# Patient Record
Sex: FEMALE | Race: WHITE | NOT HISPANIC OR LATINO | Employment: FULL TIME
[De-identification: names, ages, dates, MRNs, and addresses within clinical notes are randomized per-mention and may not be internally consistent; named-entity substitution may affect disease eponyms.]

---
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
## 2021-01-18 ENCOUNTER — RECORDS - HEALTHEAST (OUTPATIENT)
Dept: ADMINISTRATIVE | Facility: OTHER | Age: 21
End: 2021-01-18

## 2021-01-21 ENCOUNTER — COMMUNICATION - HEALTHEAST (OUTPATIENT)
Dept: TELEHEALTH | Facility: CLINIC | Age: 21
End: 2021-01-21

## 2021-01-21 ENCOUNTER — OFFICE VISIT - HEALTHEAST (OUTPATIENT)
Dept: PEDIATRICS | Facility: CLINIC | Age: 21
End: 2021-01-21

## 2021-01-21 DIAGNOSIS — H69.91 EUSTACHIAN TUBE DYSFUNCTION, RIGHT: ICD-10-CM

## 2021-01-21 RX ORDER — GLIPIZIDE 5 MG/1
5 TABLET, FILM COATED, EXTENDED RELEASE ORAL DAILY
Status: SHIPPED | COMMUNITY
Start: 2021-01-08 | End: 2024-01-31 | Stop reason: ALTCHOICE

## 2021-01-21 RX ORDER — NAPROXEN 500 MG/1
TABLET ORAL
Status: SHIPPED | COMMUNITY
Start: 2020-07-27 | End: 2024-06-12

## 2021-05-27 NOTE — PROGRESS NOTES
[facility] Pediatric Acute Visit     HPI:  Beckie Varma is a 19 y.o.  female who presents to the clinic with mom.  Mom brings her in because she has had cough with nasal congestion for 4 days now.  In addition she has had some body aches, feelings of nausea with no vomiting, and headache.  She is not running any fevers that they have noted.  Yesterday she started complaining of a sore throat.  Mom is wondering if she may have strep throat or influenza.  She did not receive a flu vaccine this fall.  Her sister was sick last week with similar symptoms but she was better within 3 days.  Mom also had similar symptoms last week but her symptoms only lasted for 24 hours.  Neither of them were seen and evaluated.        Past Med / Surg History:  Past Medical History:   Diagnosis Date     Diabetes-MODY3      No past surgical history on file.    Fam / Soc History:  Family History   Problem Relation Age of Onset     Diabetes Father      Diabetes Sister      Social History     Social History Narrative    Mom- Marion    Dad- Wally    Sister- Frieda         ROS:  Gen: No fever or fatigue  Eyes: No eye discharge.   ENT: Positive for nasal congestion and rhinorrhea and pharyngitis. No otalgia.  Resp: No SOB, positive for cough   GI:No diarrhea, positive for nausea with no vomiting  :No dysuria  MS: Positive for body and muscle aches  Skin: No rashes  Neuro: Positive for headaches  Lymph/Hematologic: No gland swelling      Objective:  Vitals: /70 (Patient Site: Left Arm, Patient Position: Sitting, Cuff Size: Adult Regular)   Pulse 100   Temp 98.1  F (36.7  C) (Oral)   Wt 140 lb (63.5 kg)   SpO2 97%     Gen: Alert, well appearing  ENT: No nasal congestion or rhinorrhea. Oropharynx normal, moist mucosa.  TMs normal bilaterally.  Eyes: Conjunctivae clear bilaterally.   Heart: Regular rate and rhythm; normal S1 and S2; no murmurs, gallops, or rubs.  Lungs: Unlabored respirations; clear breath sounds.  Abdomen: Soft,  without organomegaly. Bowel sounds normal. Nontender. No masses palpable. No distention.  Musculoskeletal: Joints with full range-of-motion. Normal upper and lower extremities.  Skin: Normal without lesions.  Neuro: Oriented. Normal reflexes; normal tone; no focal deficits appreciated. Appropriate for age.  Hematologic/Lymph/Immune: No cervical lymphadenopathy  Psychiatric: Appropriate affect      Pertinent results / imaging:  Reviewed     Assessment and Plan:    Beckie Varma is a 19 y.o. female with:    1. Fever, unspecified fever cause    - Influenza A/B Rapid Test  Results for orders placed or performed in visit on 03/27/19   Influenza A/B Rapid Test   Result Value Ref Range    Influenza  A, Rapid Antigen Influenza A antigen detected (!) No Influenza A antigen detected    Influenza B, Rapid Antigen No Influenza B antigen detected No Influenza B antigen detected       2. Influenza A    This is day 3-4 of her illness.  We discussed ongoing symptomatic treatment.  We will not start Tamiflu.  If there is no improvement or worsening symptoms she should be seen back in follow-up and mom agrees with that plan.        Delfina Kitchen CNP  3/27/2019

## 2021-05-28 ASSESSMENT — ASTHMA QUESTIONNAIRES: ACT_TOTALSCORE: 25

## 2021-06-01 VITALS — HEIGHT: 67 IN | WEIGHT: 141.2 LBS | BODY MASS INDEX: 22.16 KG/M2

## 2021-06-02 VITALS — WEIGHT: 140 LBS | BODY MASS INDEX: 22.26 KG/M2

## 2021-06-05 VITALS
WEIGHT: 147.9 LBS | HEART RATE: 64 BPM | DIASTOLIC BLOOD PRESSURE: 70 MMHG | SYSTOLIC BLOOD PRESSURE: 118 MMHG | BODY MASS INDEX: 23.51 KG/M2 | TEMPERATURE: 98.1 F

## 2021-06-14 NOTE — PROGRESS NOTES
[facility] Pediatric Acute Visit     HPI:  Beckie Varma is a 20 y.o.  female who presents to the clinic with complaints of feeling like her right ear is plugged.  She traveled to the [place] a month ago and did scuba diving with her family.  She really does not feel like this started bothering her though until about 2 days ago.  She denies any signs of illness and has no rhinitis, allergy symptoms, or cough.  This is not affecting her sleep.  She does notice that it seems to bother her more when she is chewing.  She denies any gum or tooth pain though.        Past Med / Surg History:  Past Medical History:   Diagnosis Date     Diabetes-MODY3      No past surgical history on file.    Fam / Soc History:  Family History   Problem Relation Age of Onset     Diabetes Father      Diabetes Sister      Social History     Social History Narrative    Mom- Marion    Dad- Wally    Sister- Frieda         ROS:  Gen: No fever or fatigue  Eyes: No eye discharge.   ENT: No nasal congestion or rhinorrhea. No pharyngitis.  Positive for right otalgia.  Resp: No SOB, cough or wheezing.  GI:No diarrhea, nausea or vomiting  :No dysuria  MS: No joint/bone/muscle tenderness.  Skin: No rashes  Neuro: No headaches  Lymph/Hematologic: No gland swelling      Objective:  Vitals: /70   Pulse 64   Temp 98.1  F (36.7  C) (Oral)   Wt 147 lb 14.4 oz (67.1 kg)   LMP 12/22/2020     Gen: Alert, well appearing  ENT: No nasal congestion or rhinorrhea. Oropharynx normal, moist mucosa.  Left TM is normal.  Right TM is normal but does reveal some clear fluid behind the TM.  There is decreased mobility.  Eyes: Conjunctivae clear bilaterally.   Musculoskeletal: Joints with full range-of-motion. Normal upper and lower extremities.  Skin: Normal without lesions.  Neuro: Oriented. Normal reflexes; normal tone; no focal deficits appreciated. Appropriate for age.  Hematologic/Lymph/Immune: No cervical lymphadenopathy  Psychiatric: Appropriate  affect      Pertinent results / imaging:  Reviewed     Assessment and Plan:    Beckie Varma is a 20 y.o. female with:    1. Eustachian tube dysfunction, right      I have reassured her she does not have an ear infection.  She does have poor mobility of that eardrum and I discussed eustachian tube dysfunction.  She will try gentle popping of her cheeks to see if she can pop that station tube open.  I have discussed use of Sudafed 30 mg given scheduled every 6-8 hours.  And she can try ibuprofen for the discomfort.  If the pain gets significantly worse, is affecting her sleep, and she develops a fever, she should be seen for reevaluation and she agrees with that plan.    Delfina Kitchen CNP  1/21/2021

## 2021-06-15 NOTE — PROCEDURE: PATHOLOGY BILLING
Low back pain without sciatica, unspecified back pain laterality, unspecified chronicity Cpt Code (31293, 29079 Or 26116): 22083 Cpt Code: 03758

## 2021-06-18 NOTE — PATIENT INSTRUCTIONS - HE
Patient Instructions by Delfina Kitchen CNP at 1/21/2021  3:15 PM     Author: Delfina Kitchen CNP Service: -- Author Type: Nurse Practitioner    Filed: 1/21/2021  3:29 PM Encounter Date: 1/21/2021 Status: Signed    : Delfina Kitchen CNP (Nurse Practitioner)       Patient Education     Eustachian Tube Obstruction (Child)   Sudafed 30 mg take by mouth every 6 hour-8 hours  The eustachian tube is behind the eardrum. It connects the middle ear to the back of the throat. The tube is usually closed. But it opens during yawning or swallowing. This helps make the air pressure in the middle ear the same as outside the ear. The tube also drains mucus made in the middle ear. A blocked tube is called a eustachian tube obstruction. This is common in babies, whose eustachian tubes are small and still forming.  A eustachian tube that is blocked causes pressure, pain, and loss of hearing. Sounds may be muffled. The ear may feel full. A nonverbal child may cry and pull at the ears. An older child may complain of pain, dizziness, pressure in the ear, or trouble hearing. The child may hear humming or ringing. An obstruction (blockage) can sometimes lead to an ear infection.  The blockage often goes away on its own without treatment. In other cases, treatments may be given to help reduce swelling within the tube. These may include a nasal decongestant, antihistamine, nasal spray (prescription steroid or over-the-counter saline), or allergy treatment. An ear infection may be treated with antibiotics. A blocked eustachian tube is usually a short-term problem. In certain cases, surgery may be needed to place a special tube in the eardrum. The tube helps to drain fluid from the middle ear. This may lower the risk of hearing loss and future ear infections.  Home care  Medicines may be prescribed to reduce fluid and inflammation or to treat an ear infection. Follow instructions for giving these medicines to your  child.  Prevention    Keep a baby's head upright when bottle-feeding. This is to prevent formula from entering the eustachian tube.    Don't smoke and do not let others smoke around your child.    Keep your kelsea ear canal dry. Have your child wear ear plugs when taking a bath or playing in a pool.    Teach your child to swallow or yawn to open the tubes and equalize pressure.    Take steps to reduce painful pressure in the tube when your child flies on an airplane. Pressure can build up in the eustachian tube during air travel, especially during takeoff and landing. This can be very painful for young children. If you have a young child, try breast- or bottle-feeding during takeoff and landing. Teach your older child to swallow or yawn at these times to help equalize pressure.  Follow-up care  Follow up with your kelsea healthcare provider, or as directed.  When to seek medical advice  Unless advised otherwise, call your child's healthcare provider if:    Fever (see Fever and children, below)    Hearing loss or trouble hearing    Symptoms last longer than a few weeks    Redness or swelling in the ear gets worse    Pain gets worse    Foul-smelling fluid drains from the ear    New symptoms develop     Fever and children  Always use a digital thermometer to check your kelsea temperature. Never use a mercury thermometer.  For infants and toddlers, be sure to use a rectal thermometer correctly. A rectal thermometer may accidentally poke a hole in (perforate) the rectum. It may also pass on germs from the stool. Always follow the product makers directions for proper use. If you dont feel comfortable taking a rectal temperature, use another method. When you talk to your kelsea healthcare provider, tell him or her which method you used to take your kelsea temperature.  Here are guidelines for fever temperature. Ear temperatures arent accurate before 6 months of age. Dont take an oral temperature until your child is at  least 4 years old.  Infant under 3 months old:    Ask your kelsea healthcare provider how you should take the temperature.    Rectal or forehead (temporal artery) temperature of 100.4 F (38 C) or higher, or as directed by the provider    Armpit temperature of 99 F (37.2 C) or higher, or as directed by the provider  Child age 3 to 36 months:    Rectal, forehead (temporal artery), or ear temperature of 102 F (38.9 C) or higher, or as directed by the provider    Armpit temperature of 101 F (38.3 C) or higher, or as directed by the provider  Child of any age:    Repeated temperature of 104 F (40 C) or higher, or as directed by the provider    Fever that lasts more than 24 hours in a child under 2 years old. Or a fever that lasts for 3 days in a child 2 years or older.      Date Last Reviewed: 11/1/2017 2000-2017 [employer]. [address] [place] [address]. All rights reserved. This information is not intended as a substitute for professional medical care. Always follow your healthcare professional's instructions.

## 2021-06-19 NOTE — LETTER
Letter by Delfina Kitchen CNP at      Author: Delfina Kitchen CNP Service: -- Author Type: --    Filed:  Encounter Date: 3/27/2019 Status: (Other)         March 27, 2019     Patient: Beckie Varma   YOB: 2000   Date of Visit: 3/27/2019       To Whom it May Concern:    Beckie Varma was seen in my clinic on 3/27/2019.  Please excuse her absence from school today.  She can return when feeling better.    If you have any questions or concerns, please don't hesitate to call.    Sincerely,         Electronically signed by Delfina Kitchen CNP

## 2021-06-19 NOTE — PROGRESS NOTES
[facility] Well Child Check    ASSESSMENT & PLAN  Beckie Varma is a 18  y.o. 5  m.o. who has a normal exam.  Her diabetes is under good control.  Her asthma is also under good control.  She has been having very bad cramps with her menstrual cycles and is considering seeing mom's OB/GYN for birth control.    Diagnoses and all orders for this visit:    Well adolescent visit  -     Meningococcal MCV4P  -     Hearing Screening        Return to clinic in 1 year for a Well Child Check or sooner as needed    IMMUNIZATIONS/LABS  Immunizations were reviewed and orders were placed as appropriate. and I have discussed the risks and benefits of all of the vaccine components with the patient/parents.  All questions have been answered.    REFERRALS  Dental:  The patient has already established care with a dentist.  Other:  No additional referrals were made at this time.    ANTICIPATORY GUIDANCE  I have reviewed age appropriate anticipatory guidance.    HEALTH HISTORY  Do you have any concerns that you'd like to discuss today?: menstrual cramps- advil/ aleve does not help      Roomed by: Claudia    Accompanied by Mother    Refills needed? No    Do you have any forms that need to be filled out? Yes sports form       Do you have any significant health concerns in your family history?: No  Family History   Problem Relation Age of Onset     Diabetes Father      Diabetes Sister      Since your last visit, have there been any major changes in your family, such as a move, job change, separation, divorce, or death in the family?: No  Has a lack of transportation kept you from medical appointments?: No    Home  Who lives in your home?:    Social History     Social History Narrative    Mom- Marion    Dad- Wally    Sister- Frieda     Do you have any concerns about losing your housing?: No  Is your housing safe and comfortable?: Yes  Do you have any trouble with sleep?:  No    Education  What school do you child attend?:  [employer]  What grade are you in?:  12th  How do you perform in school (grades, behavior, attention, homework?: doing well     Eating  Do you eat regular meals including fruits and vegetables?:  yes  What are you drinking (cow's milk, water, soda, juice, sports drinks, energy drinks, etc)?: cow's milk- 1%, water and almond milk  Have you been worried that you don't have enough food?: No  Do you have concerns about your body or appearance?:  No    Activities  Do you have friends?:  yes  Do you get at least one hour of physical activity per day?:  no, not as active  How many hours a day are you in front of a screen other than for schoolwork (computer, TV, phone)?:  2  What do you do for exercise?:  Hockey   Do you have interest/participate in community activities/volunteers/school sports?:  yes    MENTAL HEALTH SCREENING  No Data Recorded  No Data Recorded    VISION/HEARING  Vision: Not done: Performed elsewhere: 9/2017, eye doctor 20/20 with slightly nearsighted  Hearing:  Completed. See Results    No exam data present    TB Risk Assessment:  The patient and/or parent/guardian answer positive to:  patient and/or parent/guardian answer 'no' to all screening TB questions    Dyslipidemia Risk Screening  Have either of your parents or any of your grandparents had a stroke or heart attack before age 55?: No  Any parents with high cholesterol or currently taking medications to treat?: Yes: mom elevated cholesterol     Dental  When was the last time you saw the dentist?: 3-6 months ago   Parent/Guardian declines the fluoride varnish application today. Fluoride not applied today.    Patient Active Problem List   Diagnosis     Exercise-induced Bronchospasm     Diabetes-MODY3, under good control with oral Glipizide     High cholesterol       Drugs  Does the patient use tobacco/alcohol/drugs?:  no    Safety  Does the patient have any safety concerns (peer or home)?:  no  Does the patient use safety belts, helmets and other safety  "equipment?:  yes    Sex  Have you ever had sex?:  Yes-mom is aware. 1 partner with protection. Has broken up with him this week.  Have any of your past or current sex partners been infected with HIV, bisexual, or injection drug users?: No  Are you having unprotected sex with multiple partners?:  No  Have you or any of your partners ever been treated for a sexually transmitted infections?:  No    MEASUREMENTS  Height:  5' 6.5\" (1.689 m)  Weight: 141 lb 3.2 oz (64 kg)  BMI: Body mass index is 22.45 kg/(m^2).  Blood Pressure: 98/60  Blood pressure percentiles are 7 % systolic and 21 % diastolic based on the 2017 AAP Clinical Practice Guideline. Blood pressure percentile targets: 90: 125/78, 95: 129/82, 95 + 12 mmH/94.    PHYSICAL EXAM  Constitutional: She appears well-developed and well-nourished.   HEENT: Head: Normocephalic.    Right Ear: Tympanic membrane, external ear and canal normal.    Left Ear: Tympanic membrane, external ear and canal normal.    Nose: Nose normal.    Mouth/Throat: Mucous membranes are moist. Oropharynx is clear.    Eyes: Conjunctivae and lids are normal. Pupils are equal, round, and reactive to light. Optic discs are sharp.   Neck: Neck supple. No tenderness is present.   Cardiovascular: Normal rate and regular rhythm. No murmur heard.  Pulses: Femoral pulses are 2+ bilaterally.   Pulmonary/Chest: Effort normal and breath sounds normal. There is normal air entry. Breast development is normal.  Brown stage 4  Abdominal: Soft. There is no hepatosplenomegaly. No inguinal hernia.   Musculoskeletal: Normal range of motion. Normal strength and tone. No abnormalities. Spine is straight. Normal duck walk.  Normal heel to toe walk.   : Normal external female genitalia.  Brown stage 4  Neurological: She is alert. She has normal reflexes. Gait normal.   Psychiatric: She has a normal mood and affect. Her speech is normal and behavior is normal.  Skin: Clear. No rashes.     "

## 2021-10-11 ENCOUNTER — TRANSFERRED RECORDS (OUTPATIENT)
Dept: HEALTH INFORMATION MANAGEMENT | Facility: CLINIC | Age: 21
End: 2021-10-11

## 2021-12-20 ENCOUNTER — IMMUNIZATION (OUTPATIENT)
Dept: NURSING | Facility: CLINIC | Age: 21
End: 2021-12-20
Payer: COMMERCIAL

## 2021-12-20 PROCEDURE — 91300 PR COVID VAC PFIZER DIL RECON 30 MCG/0.3 ML IM: CPT

## 2021-12-20 PROCEDURE — 0004A PR COVID VAC PFIZER DIL RECON 30 MCG/0.3 ML IM: CPT

## 2022-06-07 ENCOUNTER — TRANSFERRED RECORDS (OUTPATIENT)
Dept: HEALTH INFORMATION MANAGEMENT | Facility: CLINIC | Age: 22
End: 2022-06-07
Payer: COMMERCIAL

## 2022-06-07 LAB
ALT SERPL-CCNC: 18 U/L (ref 0–55)
AST SERPL-CCNC: 16 U/L (ref 5–34)
CHOLESTEROL (EXTERNAL): 253 MG/DL (ref 42–199)
CREATININE (EXTERNAL): 0.72 MG/DL (ref 0.72–1.25)
GLUCOSE (EXTERNAL): 129 MG/DL (ref 74–100)
HBA1C MFR BLD: 6 % (ref 4.2–6.3)
HDLC SERPL-MCNC: 51 MG/DL
LDL CHOLESTEROL (EXTERNAL): 177 MG/DL (ref 0–1292)
POTASSIUM (EXTERNAL): 4.4 MEQ/L (ref 3.4–5.1)
TRIGLYCERIDES (EXTERNAL): 306 MG/DL (ref 0–199)
TSH SERPL-ACNC: 1.27 UIU/ML (ref 0.3–4.2)

## 2022-12-21 ENCOUNTER — APPOINTMENT (OUTPATIENT)
Dept: URBAN - METROPOLITAN AREA CLINIC 260 | Age: 22
Setting detail: DERMATOLOGY
End: 2022-12-22

## 2022-12-21 VITALS — WEIGHT: 157 LBS | HEIGHT: 66 IN

## 2022-12-21 DIAGNOSIS — D49.2 NEOPLASM OF UNSPECIFIED BEHAVIOR OF BONE, SOFT TISSUE, AND SKIN: ICD-10-CM

## 2022-12-21 PROCEDURE — OTHER COUNSELING: OTHER

## 2022-12-21 PROCEDURE — OTHER EDUCATIONAL RESOURCES PROVIDED: OTHER

## 2022-12-21 PROCEDURE — OTHER PHOTO-DOCUMENTATION: OTHER

## 2022-12-21 PROCEDURE — OTHER MIPS QUALITY: OTHER

## 2022-12-21 PROCEDURE — 56605 BIOPSY OF VULVA/PERINEUM: CPT

## 2022-12-21 PROCEDURE — OTHER BIOPSY BY SHAVE METHOD: OTHER

## 2022-12-21 ASSESSMENT — LOCATION SIMPLE DESCRIPTION DERM: LOCATION SIMPLE: MONS PUBIS

## 2022-12-21 ASSESSMENT — LOCATION DETAILED DESCRIPTION DERM: LOCATION DETAILED: RIGHT MONS PUBIS

## 2022-12-21 ASSESSMENT — LOCATION ZONE DERM: LOCATION ZONE: VULVA

## 2022-12-21 NOTE — PROCEDURE: PHOTO-DOCUMENTATION
Photo Preface (Leave Blank If You Do Not Want): Photographs were obtained of affected skin today
Detail Level: Zone
Details (Free Text): To monitor biopsy site

## 2022-12-21 NOTE — PROCEDURE: BIOPSY BY SHAVE METHOD
Hide Additional Anticipated Plan?: No
Hemostasis: Drysol
Biopsy Type: H and E
Size Of Lesion In Cm: 0
Post-Care Instructions: I reviewed with the patient in detail post-care instructions. Patient is to keep the biopsy site dry overnight, and then apply vaseline and clean bandage daily.
Dressing: Band-Aid
Cryotherapy Text: The wound bed was treated with cryotherapy after the biopsy was performed.
Silver Nitrate Text: The wound bed was treated with silver nitrate after the biopsy was performed.
Was A Bandage Applied: Yes
Type Of Destruction Used: Curettage
Depth Of Biopsy: dermis
Path Notes (To The Dermatopathologist): Please check margins
Curettage Text: The wound bed was treated with curettage after the biopsy was performed.
Detail Level: Detailed
Notification Instructions: Patient will be notified of biopsy results. However, patient instructed to call the office if not contacted within 2 weeks.
Electrodesiccation Text: The wound bed was treated with electrodesiccation after the biopsy was performed.
Information: Selecting Yes will display possible errors in your note based on the variables you have selected. This validation is only offered as a suggestion for you. PLEASE NOTE THAT THE VALIDATION TEXT WILL BE REMOVED WHEN YOU FINALIZE YOUR NOTE. IF YOU WANT TO FAX A PRELIMINARY NOTE YOU WILL NEED TO TOGGLE THIS TO 'NO' IF YOU DO NOT WANT IT IN YOUR FAXED NOTE.
Billing Type: Third-Party Bill
Consent: Written consent was obtained and risks were reviewed including but not limited to scarring, infection, bleeding, scabbing, incomplete removal, nerve damage and allergy to anesthesia.
Wound Care: Vaseline
Biopsy Method: Dermablade
Electrodesiccation And Curettage Text: The wound bed was treated with electrodesiccation and curettage after the biopsy was performed.

## 2022-12-28 ENCOUNTER — TRANSFERRED RECORDS (OUTPATIENT)
Dept: HEALTH INFORMATION MANAGEMENT | Facility: CLINIC | Age: 22
End: 2022-12-28

## 2022-12-29 ENCOUNTER — TRANSFERRED RECORDS (OUTPATIENT)
Dept: HEALTH INFORMATION MANAGEMENT | Facility: CLINIC | Age: 22
End: 2022-12-29

## 2022-12-29 ENCOUNTER — LAB (OUTPATIENT)
Dept: LAB | Facility: CLINIC | Age: 22
End: 2022-12-29
Payer: COMMERCIAL

## 2022-12-29 DIAGNOSIS — E13.65 OTHER SPECIFIED DIABETES MELLITUS WITH HYPERGLYCEMIA (H): ICD-10-CM

## 2022-12-29 DIAGNOSIS — E13.65 OTHER SPECIFIED DIABETES MELLITUS WITH HYPERGLYCEMIA (H): Primary | ICD-10-CM

## 2022-12-29 LAB
ALBUMIN SERPL BCG-MCNC: 4.5 G/DL (ref 3.5–5.2)
ALP SERPL-CCNC: 54 U/L (ref 35–104)
ALT SERPL W P-5'-P-CCNC: 14 U/L (ref 10–35)
ANION GAP SERPL CALCULATED.3IONS-SCNC: 14 MMOL/L (ref 7–15)
AST SERPL W P-5'-P-CCNC: 18 U/L (ref 10–35)
BILIRUB SERPL-MCNC: 0.2 MG/DL
BUN SERPL-MCNC: 16.9 MG/DL (ref 6–20)
CALCIUM SERPL-MCNC: 9.7 MG/DL (ref 8.6–10)
CHLORIDE SERPL-SCNC: 102 MMOL/L (ref 98–107)
CHOLEST SERPL-MCNC: 272 MG/DL
CK SERPL-CCNC: 44 U/L (ref 26–192)
CREAT SERPL-MCNC: 0.84 MG/DL (ref 0.51–0.95)
DEPRECATED HCO3 PLAS-SCNC: 20 MMOL/L (ref 22–29)
GFR SERPL CREATININE-BSD FRML MDRD: >90 ML/MIN/1.73M2
GLUCOSE SERPL-MCNC: 160 MG/DL (ref 70–99)
HDLC SERPL-MCNC: 53 MG/DL
LDLC SERPL CALC-MCNC: 176 MG/DL
NONHDLC SERPL-MCNC: 219 MG/DL
POTASSIUM SERPL-SCNC: 4.5 MMOL/L (ref 3.4–5.3)
PROT SERPL-MCNC: 7.2 G/DL (ref 6.4–8.3)
SODIUM SERPL-SCNC: 136 MMOL/L (ref 136–145)
TRIGL SERPL-MCNC: 213 MG/DL

## 2022-12-29 PROCEDURE — 36415 COLL VENOUS BLD VENIPUNCTURE: CPT

## 2022-12-29 PROCEDURE — 80053 COMPREHEN METABOLIC PANEL: CPT

## 2022-12-29 PROCEDURE — 82550 ASSAY OF CK (CPK): CPT

## 2022-12-29 PROCEDURE — 80061 LIPID PANEL: CPT

## 2023-01-05 ENCOUNTER — TRANSFERRED RECORDS (OUTPATIENT)
Dept: HEALTH INFORMATION MANAGEMENT | Facility: CLINIC | Age: 23
End: 2023-01-05

## 2023-01-05 LAB — RETINOPATHY: NEGATIVE

## 2023-02-05 ENCOUNTER — HEALTH MAINTENANCE LETTER (OUTPATIENT)
Age: 23
End: 2023-02-05

## 2023-02-20 LAB — PAP SMEAR - HIM PATIENT REPORTED: NORMAL

## 2023-05-14 ENCOUNTER — HEALTH MAINTENANCE LETTER (OUTPATIENT)
Age: 23
End: 2023-05-14

## 2023-10-15 ENCOUNTER — HEALTH MAINTENANCE LETTER (OUTPATIENT)
Age: 23
End: 2023-10-15

## 2024-01-31 ENCOUNTER — OFFICE VISIT (OUTPATIENT)
Dept: FAMILY MEDICINE | Facility: CLINIC | Age: 24
End: 2024-01-31
Payer: COMMERCIAL

## 2024-01-31 VITALS
HEART RATE: 90 BPM | HEIGHT: 67 IN | WEIGHT: 124 LBS | DIASTOLIC BLOOD PRESSURE: 78 MMHG | TEMPERATURE: 97.5 F | SYSTOLIC BLOOD PRESSURE: 117 MMHG | OXYGEN SATURATION: 96 % | RESPIRATION RATE: 18 BRPM | BODY MASS INDEX: 19.46 KG/M2

## 2024-01-31 DIAGNOSIS — Z71.84 TRAVEL ADVICE ENCOUNTER: Primary | ICD-10-CM

## 2024-01-31 PROCEDURE — 90717 YELLOW FEVER VACCINE SUBQ: CPT | Mod: GA | Performed by: NURSE PRACTITIONER

## 2024-01-31 PROCEDURE — 90471 IMMUNIZATION ADMIN: CPT | Mod: GA | Performed by: NURSE PRACTITIONER

## 2024-01-31 PROCEDURE — 90472 IMMUNIZATION ADMIN EACH ADD: CPT | Mod: GA | Performed by: NURSE PRACTITIONER

## 2024-01-31 PROCEDURE — 99403 PREV MED CNSL INDIV APPRX 45: CPT | Mod: 25 | Performed by: NURSE PRACTITIONER

## 2024-01-31 PROCEDURE — 90691 TYPHOID VACCINE IM: CPT | Mod: GA | Performed by: NURSE PRACTITIONER

## 2024-01-31 RX ORDER — AZITHROMYCIN 500 MG/1
500 TABLET, FILM COATED ORAL DAILY
Qty: 3 TABLET | Refills: 0 | Status: SHIPPED | OUTPATIENT
Start: 2024-01-31 | End: 2024-02-03

## 2024-01-31 RX ORDER — ATORVASTATIN CALCIUM 10 MG/1
10 TABLET, FILM COATED ORAL DAILY
COMMUNITY

## 2024-01-31 RX ORDER — SEMAGLUTIDE 1.34 MG/ML
0.75 INJECTION, SOLUTION SUBCUTANEOUS
COMMUNITY
End: 2024-06-12

## 2024-01-31 ASSESSMENT — ASTHMA QUESTIONNAIRES
QUESTION_4 LAST FOUR WEEKS HOW OFTEN HAVE YOU USED YOUR RESCUE INHALER OR NEBULIZER MEDICATION (SUCH AS ALBUTEROL): NOT AT ALL
ACT_TOTALSCORE: 25
QUESTION_3 LAST FOUR WEEKS HOW OFTEN DID YOUR ASTHMA SYMPTOMS (WHEEZING, COUGHING, SHORTNESS OF BREATH, CHEST TIGHTNESS OR PAIN) WAKE YOU UP AT NIGHT OR EARLIER THAN USUAL IN THE MORNING: NOT AT ALL
QUESTION_1 LAST FOUR WEEKS HOW MUCH OF THE TIME DID YOUR ASTHMA KEEP YOU FROM GETTING AS MUCH DONE AT WORK, SCHOOL OR AT HOME: NONE OF THE TIME
QUESTION_5 LAST FOUR WEEKS HOW WOULD YOU RATE YOUR ASTHMA CONTROL: COMPLETELY CONTROLLED
ACT_TOTALSCORE: 25
QUESTION_2 LAST FOUR WEEKS HOW OFTEN HAVE YOU HAD SHORTNESS OF BREATH: NOT AT ALL

## 2024-01-31 ASSESSMENT — PAIN SCALES - GENERAL: PAINLEVEL: NO PAIN (0)

## 2024-01-31 NOTE — PROGRESS NOTES
"Nurse Note ( Pre-Travel Consult)    Itinerary:  [place], [place], [place], [place]    Departure Date: 03/16, 04/11, 05/10, 06/?    Return Date: 03/28, 04/15, 05/22, ?    Length of Trip Varied     Reason for Travel: Tourism         Urban or rural: both    Accommodations: Hotel  Hostel        IMMUNIZATION HISTORY  Have you received any immunizations within the past 4 weeks?  No  Have you ever fainted from having your blood drawn or from an injection?  No  Have you ever had a fever reaction to vaccination?  No  Have you ever had any bad reaction or side effect from any vaccination?  No  Have you ever had hepatitis A or B vaccine?  Yes  Do you live (or work closely) with anyone who has AIDS, an AIDS-like condition, any other immune disorder or who is on chemotherapy for cancer?  No  Do you have a family history of immunodeficiency?  No  Have you received any injection of immune globulin or any blood products during the past 12 months?  No    Patient roomed by Flaco MANCILLA Tanapaulanyla is a 23 year old female seen today with Mom  for counsultation for international travel.   Patient will be departing in  6 week(s) and  traveling pn a several trips and traveling with  friends fpr Trip 1, Family for Trip 2, Friends Trip 3 .      Patient itinerary :  will be in the urban region of  [facility] and other regions including [place].    April Trip : [place]  itinerary discussed     May trip : [place]    End of June:       Patient's activities will include sightseeing, camping, hiking, and beach activities (salt water).    Patient's country of birth is [place]    Special medical concerns: Diabetes MODY3    Pre-travel questionnaire was completed by patient and reviewed by provider.     Vitals: /78   Pulse 90   Temp 97.5  F (36.4  C) (Temporal)   Resp 18   Ht 1.694 m (5' 6.7\")   Wt 56.2 kg (124 lb)   LMP 01/14/2024 (Exact Date)   SpO2 96%   BMI 19.60 kg/m    BMI= Body mass index is 19.6 " kg/m .    EXAM:  General:  Well-nourished, well-developed in no acute distress.  Appears to be stated age, interacts appropriately and expresses understanding of information given to patient.    Current Outpatient Medications   Medication Sig Dispense Refill    atorvastatin (LIPITOR) 10 MG tablet Take 10 mg by mouth daily      azithromycin (ZITHROMAX) 500 MG tablet Take 1 tablet (500 mg) by mouth daily for 3 doses Take 1 tablet a day for up to 3 days for severe diarrhea 3 tablet 0    glipiZIDE (GLUCOTROL) 2.5 MG 24 hr tablet [GLIPIZIDE (GLUCOTROL) 2.5 MG 24 HR TABLET]   5    semaglutide (OZEMPIC, 1 MG/DOSE,) 2 MG/1.5ML pen Inject 0.75 mg Subcutaneous every 7 days      ibuprofen (ADVIL,MOTRIN) 200 MG tablet [IBUPROFEN (ADVIL,MOTRIN) 200 MG TABLET] Take 400 mg by mouth every 6 (six) hours as needed for pain. (Patient not taking: Reported on 1/31/2024)      naproxen (NAPROSYN) 500 MG tablet [NAPROXEN (NAPROSYN) 500 MG TABLET] TK 1 T PO Q 12 HOURS PRN PAIN (Patient not taking: Reported on 1/31/2024)       Patient Active Problem List   Diagnosis    Exercise-induced Bronchospasm    Diabetes-MODY3, under good control with oral Glipizide    High cholesterol     No Known Allergies      Immunizations discussed include:   Covid 19: deferred  Hepatitis A:  Up to date  Hepatitis B: Up to date  Influenza: Declined  Not concerned about risk of disease  Typhoid: Ordered/given today, risks, benefits and side effects reviewed  Rabies: future order placed ( checking insurance )   Yellow Fever: Yellow Fever ordered/given today - side effects, precautions, allergies, risks discussed. Patient expressed understanding.  [place] Encephalitis: Not indicated  Meningococcus: Up to date  Tetanus/Diphtheria: will get at Wellness visit  Measles/Mumps/Rubella: Up to date  Cholera: Not needed  Polio: Up to date  Pneumococcal: Up to date  Varicella: Up to date  Shingrix: Not indicated  HPV:  Up to date     TB: low risk     Altitude Exposure on this  trip: no  Past tolerance to Altitude: na    ASSESSMENT/PLAN:  Beckie was seen today for travel clinic.    Diagnoses and all orders for this visit:    Travel advice encounter  -     azithromycin (ZITHROMAX) 500 MG tablet; Take 1 tablet (500 mg) by mouth daily for 3 doses Take 1 tablet a day for up to 3 days for severe diarrhea    Other orders  -     Cancel: YELLOW FEVER, LIVE SQ  -     TYPHOID VACCINE, IM; Standing  -     RABIES VACCINE, IM (IMOVAX); Standing  -     YELLOW FEVER, LIVE SQ; Standing  -     YELLOW FEVER, LIVE SQ  -     TYPHOID VACCINE, IM      I have reviewed general recommendations for safe travel   including: food/water precautions, insect precautions, safer sex   practices given high prevalence of Zika, HIV and other STDs,   roadway safety. Educational materials and Travax report provided.    Malaraia prophylaxis recommended: not indicated per travax  Symptomatic treatment for traveler's diarrhea: azithromycin        Evacuation insurance advised and resources were provided to patient.    Total visit time 45 minutes  with over 50% of time spent counseling patient and shared decision making as detailed above.    Delfina Leiva CNP  Certificate in Travel Health

## 2024-01-31 NOTE — PATIENT INSTRUCTIONS
Thank you for visiting the [facility] : [phone]  Today January 31, 2024 you received the    Future   Rabies series:  Day 0 , Day 7 then a booster in 4 weeks or more  Typhoid  Yellow Fever     Get a Tdap at your next Wellness visit     Follow up vaccine appointments can be made as a NURSE ONLY visit at the Travel Clinic, (BE PREPARED TO WAIT, ) or at designated [facility].    If you are receiving the Rabies vaccines series, it is important that you follow the exact schedule ordered.     Pre-travel     We recommend that you purchase Medical Evacuation Insurance prior to your departure.  Https://wwwnc.cdc.gov/travel/page/insurance    [place] your travel plans with the [place] Department of State through STEP ( Smart Traveler Enrollment Program ) https://step.state.gov.  STEP is a free service to allow U[place] citizens and nationals traveling and living abroad to enroll their trip with the nearest U.S. Embassy or Consulate.    Animal Exposure: Avoid all mammals even if they look healthy.  If there is a bite, scratch or even a lick, wash area immediately with soap and water for 15 minutes and seek medical care within 24 hours for evaluation of Rabies post exposure treatment.  Contact your Medical Evacuation Insurance.    Repiratory illness prevention strategies ( Covid and Influenza ) CDC recommendations:  Consider wearing a mask in crowded or poorly ventilated indoor areas, including on public transportation and in transportation hubs.  Hand washing: frequent, thorough handwashing with soap and water for 20 seconds. Use an alcohol-based hand  with at least 60% alcohol if soap and water are not readily available. Avoid touching face, nose, eyes, mouth unless you have done appropriate hand washing as above.  VACCINES: Staying up to date on COVID-19 vaccines significantly lowers the risk of getting very sick, being hospitalized, or dying from COVID-19. CDC recommends  that everyone stay up to date on their COVID-19 vaccines, especially people with weakened immune systems.    Travel Covid 19 Testing:  updated 12/06/2021  International travelers: Pre-travel:  See country specific Embassy websites or airline websites.    Post travel: CDC recommends getting tested 3-5 days after your trip     Post-travel illness:  Contact your provider or [facility] if you develop a fever, rash, cough, diarrhea or other symptoms for up to 1 year after travel.  Inform your healthcare provider when and where you traveled to.    Please call the [facility] with any questions [phone]  Or send your provider a 'My Chart' note.

## 2024-02-10 ENCOUNTER — TRANSFERRED RECORDS (OUTPATIENT)
Dept: MULTI SPECIALTY CLINIC | Facility: CLINIC | Age: 24
End: 2024-02-10
Payer: COMMERCIAL

## 2024-02-10 LAB — RETINOPATHY: NORMAL

## 2024-02-27 ENCOUNTER — OFFICE VISIT (OUTPATIENT)
Dept: FAMILY MEDICINE | Facility: CLINIC | Age: 24
End: 2024-02-27
Payer: COMMERCIAL

## 2024-02-27 VITALS
WEIGHT: 124 LBS | HEIGHT: 66 IN | BODY MASS INDEX: 19.93 KG/M2 | HEART RATE: 94 BPM | RESPIRATION RATE: 12 BRPM | SYSTOLIC BLOOD PRESSURE: 112 MMHG | TEMPERATURE: 98.4 F | OXYGEN SATURATION: 97 % | DIASTOLIC BLOOD PRESSURE: 73 MMHG

## 2024-02-27 DIAGNOSIS — E13.9 MATURITY ONSET DIABETES OF YOUNG (MODY) TYPE 3 (H): Primary | ICD-10-CM

## 2024-02-27 DIAGNOSIS — Z12.4 PAP SMEAR FOR CERVICAL CANCER SCREENING: ICD-10-CM

## 2024-02-27 DIAGNOSIS — Z11.3 SCREENING FOR STDS (SEXUALLY TRANSMITTED DISEASES): ICD-10-CM

## 2024-02-27 PROBLEM — N94.6 DYSMENORRHEA: Status: ACTIVE | Noted: 2024-02-27

## 2024-02-27 PROCEDURE — 90471 IMMUNIZATION ADMIN: CPT | Performed by: NURSE PRACTITIONER

## 2024-02-27 PROCEDURE — 87491 CHLMYD TRACH DNA AMP PROBE: CPT | Performed by: NURSE PRACTITIONER

## 2024-02-27 PROCEDURE — G0145 SCR C/V CYTO,THINLAYER,RESCR: HCPCS | Performed by: NURSE PRACTITIONER

## 2024-02-27 PROCEDURE — 99395 PREV VISIT EST AGE 18-39: CPT | Mod: 25 | Performed by: NURSE PRACTITIONER

## 2024-02-27 PROCEDURE — 87591 N.GONORRHOEAE DNA AMP PROB: CPT | Performed by: NURSE PRACTITIONER

## 2024-02-27 PROCEDURE — 90715 TDAP VACCINE 7 YRS/> IM: CPT | Performed by: NURSE PRACTITIONER

## 2024-02-27 RX ORDER — SEMAGLUTIDE 1.34 MG/ML
INJECTION, SOLUTION SUBCUTANEOUS
COMMUNITY
Start: 2024-02-14

## 2024-02-27 RX ORDER — ACYCLOVIR 400 MG/1
TABLET ORAL
COMMUNITY
Start: 2024-01-24

## 2024-02-27 SDOH — HEALTH STABILITY: PHYSICAL HEALTH: ON AVERAGE, HOW MANY DAYS PER WEEK DO YOU ENGAGE IN MODERATE TO STRENUOUS EXERCISE (LIKE A BRISK WALK)?: 3 DAYS

## 2024-02-27 SDOH — HEALTH STABILITY: PHYSICAL HEALTH: ON AVERAGE, HOW MANY MINUTES DO YOU ENGAGE IN EXERCISE AT THIS LEVEL?: 30 MIN

## 2024-02-27 ASSESSMENT — SOCIAL DETERMINANTS OF HEALTH (SDOH): HOW OFTEN DO YOU GET TOGETHER WITH FRIENDS OR RELATIVES?: THREE TIMES A WEEK

## 2024-02-27 NOTE — PROGRESS NOTES
Preventive Care Visit  [facility] GRANT Goddard CNP, Family Medicine  Feb 27, 2024    Assessment & Plan     Screening for STDs (sexually transmitted diseases)  *  - Chlamydia trachomatis/Neisseria gonorrhoeae by PCR - Clinic Collect    Pap smear for cervical cancer screening  *  - Pap screen only - recommended age 21 - 24 years    Maturity onset diabetes of young (PORSCHE) type 3 (H)  *    -She will continue to follow-up with her endocrinology provider.  I tried to obtain information on Care Everywhere but was not able to pull in information from her clinic.  She has plans to continue to see that endocrinology provider.  She is currently applying for medical schools.  I offered endocrinology referral to [facility], and she declined at this time.   -She stated that her A1c was 5.4 when it was last checked.  Not noticing any hypoglycemic events.  It appears that she is tolerating Ozempic without side effects.  Lifestyle changes and increase protein/fiber was discussed today.  Not noticing any constipation with Ozempic.   -She stated that Pap smear was abnormal in 2022.  I was not able to pull in that information.  She stated that she had a repeat in 2023 and it was negative.  She is present today to repeat.  If negative Pap, she should be able to go back to normal screening.   -STD testing done.  She declined any HIV or hepatitis C testing.  She is not noticing any symptoms.   -Follow-up yearly.     Patient has been advised of split billing requirements and indicates understanding: Yes          Counseling  Appropriate preventive services were discussed with this patient, including applicable screening as appropriate for fall prevention, nutrition, physical activity, Tobacco-use cessation, weight loss and cognition.  Checklist reviewing preventive services available has been given to the patient.  Reviewed patient's diet, addressing concerns and/or questions.   She is at risk for lack of  exercise and has been provided with information to increase physical activity for the benefit of her well-being.   She is at risk for psychosocial distress and has been provided with information to reduce risk.           Eufemia Butts is a 24 year old, presenting for the following:  Physical and Gyn Exam    -She does get done going to college, and applied for medical school.  She used to live in the [place].  She follows endocrinology around that area.  She denied any hypoglycemic events.  She has been taking Ozempic, and denied any side effects.  She stated that she is not noticing any more weight loss, and has been eating a healthy diet with protein and fiber.  She denied any constipation with this medication.         2/27/2024     9:19 AM   Additional Questions   Roomed by Nathen        Via the Health Maintenance questionnaire, the patient has reported the following services have been completed -Eye Exam-Cervical Cancer Screening, this information has been sent to the abstraction team.  Health Care Directive  Patient does not have a Health Care Directive or Living Will: Discussed advance care planning with patient; however, patient declined at this time.    Healthy Habits:     Getting at least 3 servings of Calcium per day:  Yes    Bi-annual eye exam:  Yes    Dental care twice a year:  Yes    Sleep apnea or symptoms of sleep apnea:  None    Diet:  Regular (no restrictions)    Frequency of exercise:  4-5 days/week    Duration of exercise:  15-30 minutes    Taking medications regularly:  Yes    Barriers to taking medications:  None    Medication side effects:  None    Additional concerns today:  No          2/27/2024   General Health   How would you rate your overall physical health? Good   Feel stress (tense, anxious, or unable to sleep) Only a little   (!) STRESS CONCERN      2/27/2024   Nutrition   Three or more servings of calcium each day? Yes   Diet: Diabetic   How many servings of fruit and  vegetables per day? (!) 2-3   How many sweetened beverages each day? 0-1         2/27/2024   Exercise   Days per week of moderate/strenous exercise 3 days   Average minutes spent exercising at this level 30 min         2/27/2024   Social Factors   Frequency of gathering with friends or relatives Three times a week   Worry food won't last until get money to buy more No   Food not last or not have enough money for food? No   Do you have housing?  Yes   Are you worried about losing your housing? No   Lack of transportation? No   Unable to get utilities (heat,electricity)? No         2/27/2024   Dental   Dentist two times every year? Yes         2/27/2024   TB Screening   Were you born outside of [place]?  No           Today's PHQ-2 Score:       1/31/2024     9:19 AM   PHQ-2 ( 1999 Pfizer)   Q1: Little interest or pleasure in doing things 0   Q2: Feeling down, depressed or hopeless 0   PHQ-2 Score 0         2/27/2024   Substance Use   Alcohol more than 3/day or more than 7/wk No   Do you use any other substances recreationally? No     Social History     Tobacco Use    Smoking status: Never     Passive exposure: Never    Smokeless tobacco: Never   Vaping Use    Vaping Use: Never used             2/27/2024   One time HIV Screening   Previous HIV test? Yes         2/27/2024   STI Screening   New sexual partner(s) since last STI/HIV test? (!) YES      History of abnormal Pap smear: YES - other categories - see link Cervical Cytology Screening Guidelines             2/27/2024   Contraception/Family Planning   Questions about contraception or family planning No        Reviewed and updated as needed this visit by Provider                    Past Medical History:   Diagnosis Date    Diabetes (H)      No past surgical history on file.      Review of Systems  Constitutional, HEENT, cardiovascular, pulmonary, gi and gu systems are negative, except as otherwise noted.     Objective    Exam  /73   Pulse 94   Temp 98.4  F (36.9  " C) (Oral)   Resp 12   Ht 1.676 m (5' 6\")   Wt 56.2 kg (124 lb)   LMP 01/12/2024 (Exact Date)   SpO2 97%   BMI 20.01 kg/m     Estimated body mass index is 20.01 kg/m  as calculated from the following:    Height as of this encounter: 1.676 m (5' 6\").    Weight as of this encounter: 56.2 kg (124 lb).    Physical Exam  GENERAL: alert and no distress  NECK: no adenopathy, no asymmetry, masses, or scars  RESP: lungs clear to auscultation - no rales, rhonchi or wheezes  CV: regular rate and rhythm, normal S1 S2, no S3 or S4, no murmur, click or rub, no peripheral edema  ABDOMEN: soft, nontender, no hepatosplenomegaly, no masses and bowel sounds normal   (female): normal female external genitalia, normal urethral meatus, normal vaginal mucosa  MS: no gross musculoskeletal defects noted, no edema      Signed Electronically by: GRANT Estrada CNP    "

## 2024-02-28 LAB
C TRACH DNA SPEC QL PROBE+SIG AMP: NEGATIVE
N GONORRHOEA DNA SPEC QL NAA+PROBE: NEGATIVE

## 2024-02-29 LAB
BKR LAB AP GYN ADEQUACY: NORMAL
BKR LAB AP GYN INTERPRETATION: NORMAL
BKR LAB AP HPV REFLEX: NO
BKR LAB AP LMP: NORMAL
BKR LAB AP PREVIOUS ABNL DX: NORMAL
BKR LAB AP PREVIOUS ABNORMAL: NORMAL
PATH REPORT.COMMENTS IMP SPEC: NORMAL
PATH REPORT.COMMENTS IMP SPEC: NORMAL
PATH REPORT.RELEVANT HX SPEC: NORMAL

## 2024-03-01 PROBLEM — Z87.42 HX OF ABNORMAL CERVICAL PAP SMEAR: Status: ACTIVE | Noted: 2024-03-01

## 2024-03-03 ENCOUNTER — HEALTH MAINTENANCE LETTER (OUTPATIENT)
Age: 24
End: 2024-03-03

## 2024-06-06 ENCOUNTER — TRANSFERRED RECORDS (OUTPATIENT)
Dept: MULTI SPECIALTY CLINIC | Facility: CLINIC | Age: 24
End: 2024-06-06
Payer: COMMERCIAL

## 2024-06-06 LAB
ALBUMIN (URINE) MG/SPEC: 1.3 MG/DL
ALBUMIN/CREATININE RATIO: 3 MG/G (ref 0–30)
CREATININE (EXTERNAL): 0.6 MG/DL (ref 0.4–1.1)
CREATININE (URINE): 389.8 MG/DL
GFR ESTIMATED (EXTERNAL): >120 ML/MIN/BSA
GLUCOSE (EXTERNAL): 178 MG/DL (ref 70–100)
HBA1C MFR BLD: 5.5 % (ref 4.8–5.9)
POTASSIUM (EXTERNAL): 3.9 MMOL/L (ref 3.5–5.4)

## 2024-06-12 ENCOUNTER — OFFICE VISIT (OUTPATIENT)
Dept: FAMILY MEDICINE | Facility: CLINIC | Age: 24
End: 2024-06-12
Payer: COMMERCIAL

## 2024-06-12 VITALS
HEART RATE: 87 BPM | WEIGHT: 128.2 LBS | TEMPERATURE: 98.8 F | HEIGHT: 66 IN | BODY MASS INDEX: 20.6 KG/M2 | SYSTOLIC BLOOD PRESSURE: 98 MMHG | RESPIRATION RATE: 14 BRPM | OXYGEN SATURATION: 98 % | DIASTOLIC BLOOD PRESSURE: 60 MMHG

## 2024-06-12 DIAGNOSIS — Z02.0 SCHOOL HEALTH EXAMINATION: Primary | ICD-10-CM

## 2024-06-12 DIAGNOSIS — Z01.84 IMMUNITY STATUS TESTING: ICD-10-CM

## 2024-06-12 DIAGNOSIS — Z11.1 SCREENING-PULMONARY TB: ICD-10-CM

## 2024-06-12 PROCEDURE — 36415 COLL VENOUS BLD VENIPUNCTURE: CPT | Performed by: NURSE PRACTITIONER

## 2024-06-12 PROCEDURE — 86481 TB AG RESPONSE T-CELL SUSP: CPT | Performed by: NURSE PRACTITIONER

## 2024-06-12 PROCEDURE — 86706 HEP B SURFACE ANTIBODY: CPT | Performed by: NURSE PRACTITIONER

## 2024-06-12 PROCEDURE — 99213 OFFICE O/P EST LOW 20 MIN: CPT | Performed by: NURSE PRACTITIONER

## 2024-06-12 RX ORDER — GLIPIZIDE 5 MG/1
0.5 TABLET ORAL
COMMUNITY
Start: 2024-04-03 | End: 2024-06-12

## 2024-06-12 SDOH — HEALTH STABILITY: PHYSICAL HEALTH: ON AVERAGE, HOW MANY DAYS PER WEEK DO YOU ENGAGE IN MODERATE TO STRENUOUS EXERCISE (LIKE A BRISK WALK)?: 4 DAYS

## 2024-06-12 SDOH — HEALTH STABILITY: PHYSICAL HEALTH: ON AVERAGE, HOW MANY MINUTES DO YOU ENGAGE IN EXERCISE AT THIS LEVEL?: 30 MIN

## 2024-06-12 ASSESSMENT — ANXIETY QUESTIONNAIRES
4. TROUBLE RELAXING: NOT AT ALL
5. BEING SO RESTLESS THAT IT IS HARD TO SIT STILL: NOT AT ALL
6. BECOMING EASILY ANNOYED OR IRRITABLE: NOT AT ALL
GAD7 TOTAL SCORE: 0
7. FEELING AFRAID AS IF SOMETHING AWFUL MIGHT HAPPEN: NOT AT ALL
2. NOT BEING ABLE TO STOP OR CONTROL WORRYING: NOT AT ALL
1. FEELING NERVOUS, ANXIOUS, OR ON EDGE: NOT AT ALL
GAD7 TOTAL SCORE: 0
3. WORRYING TOO MUCH ABOUT DIFFERENT THINGS: NOT AT ALL
GAD7 TOTAL SCORE: 0
7. FEELING AFRAID AS IF SOMETHING AWFUL MIGHT HAPPEN: NOT AT ALL

## 2024-06-12 ASSESSMENT — PATIENT HEALTH QUESTIONNAIRE - PHQ9
SUM OF ALL RESPONSES TO PHQ QUESTIONS 1-9: 0
SUM OF ALL RESPONSES TO PHQ QUESTIONS 1-9: 0

## 2024-06-12 ASSESSMENT — SOCIAL DETERMINANTS OF HEALTH (SDOH): HOW OFTEN DO YOU GET TOGETHER WITH FRIENDS OR RELATIVES?: TWICE A WEEK

## 2024-06-12 ASSESSMENT — PAIN SCALES - GENERAL: PAINLEVEL: NO PAIN (0)

## 2024-06-12 NOTE — PROGRESS NOTES
"    Eufemia Butts is a 24 year old, presenting for the following health issues:  School Physical (Recently admitted to [facility] - Needs Labs )      6/12/2024    10:31 AM   Additional Questions   Roomed by  LPN     HPI     Here for school physical.  Will be going to medical school in [place].  Needs blood work and form completed.  Had diabetes follow-up with her endocrinologist last week- diabetes is in excellent control.   Overall feeling well.  No particular complaints or concerns.       Review of Systems  Constitutional, HEENT, cardiovascular, pulmonary, GI, , musculoskeletal, neuro, skin, endocrine and psych systems are negative, except as otherwise noted.      Objective    BP 98/60 (BP Location: Left arm, Patient Position: Sitting, Cuff Size: Adult Regular)   Pulse 87   Temp 98.8  F (37.1  C) (Oral)   Resp 14   Ht 1.676 m (5' 6\")   Wt 58.2 kg (128 lb 3.2 oz)   LMP 06/12/2024   SpO2 98%   Breastfeeding No   BMI 20.69 kg/m    Body mass index is 20.69 kg/m .  Physical Exam   GENERAL: alert and no distress  EYES: Eyes grossly normal to inspection, PERRL and conjunctivae and sclerae normal  HENT: ear canals and TM's normal, nose and mouth without ulcers or lesions  NECK: no adenopathy, no asymmetry, masses, or scars  RESP: lungs clear to auscultation - no rales, rhonchi or wheezes  CV: regular rate and rhythm, normal S1 S2, no S3 or S4, no murmur, click or rub, no peripheral edema  ABDOMEN: soft, nontender, no hepatosplenomegaly, no masses and bowel sounds normal  MS: no gross musculoskeletal defects noted, no edema  SKIN: no suspicious lesions or rashes  NEURO: Normal strength and tone, mentation intact and speech normal  PSYCH: mentation appears normal, affect normal/bright    6/6/24 blood work results reviewed    A/P:  1. School health examination    2. Screening-pulmonary TB  - Quantiferon TB Gold Plus; Future  - Quantiferon TB Gold Plus    3. Immunity status testing  - Hepatitis " B Surface Antibody; Future  - Hepatitis B Surface Antibody          Signed Electronically by: Radha Villatoro CNP    Answers submitted by the patient for this visit:  Patient Health Questionnaire (Submitted on 6/12/2024)  PHQ9 TOTAL SCORE: 0  JAMES-7 (Submitted on 6/12/2024)  JAMES 7 TOTAL SCORE: 0

## 2024-06-12 NOTE — LETTER
My Asthma Action Plan    Name: Beckie Varma   YOB: 2000  Date: 6/12/2024   My doctor: Radha Villatoro CNP   My clinic: [facility]        My Rescue Medicine:   Albuterol inhaler (Proair/Ventolin/Proventil HFA)  2-4 puffs EVERY 4 HOURS as needed. Use a spacer if recommended by your provider.   My Asthma Severity:   Intermittent / Exercise Induced  Know your asthma triggers: upper respiratory infections and exercise or sports             GREEN ZONE   Good Control  I feel good  No cough or wheeze  Can work, sleep and play without asthma symptoms       Take your asthma control medicine every day.     If exercise triggers your asthma, take your rescue medication  15 minutes before exercise or sports, and  During exercise if you have asthma symptoms  Spacer to use with inhaler: If you have a spacer, make sure to use it with your inhaler             YELLOW ZONE Getting Worse  I have ANY of these:  I do not feel good  Cough or wheeze  Chest feels tight  Wake up at night   Keep taking your Green Zone medications  Start taking your rescue medicine:  every 20 minutes for up to 1 hour. Then every 4 hours for 24-48 hours.  If you stay in the Yellow Zone for more than 12-24 hours, contact your doctor.  If you do not return to the Green Zone in 12-24 hours or you get worse, start taking your oral steroid medicine if prescribed by your provider.           RED ZONE Medical Alert - Get Help  I have ANY of these:  I feel awful  Medicine is not helping  Breathing getting harder  Trouble walking or talking  Nose opens wide to breathe       Take your rescue medicine NOW  If your provider has prescribed an oral steroid medicine, start taking it NOW  Call your doctor NOW  If you are still in the Red Zone after 20 minutes and you have not reached your doctor:  Take your rescue medicine again and  Call 911 or go to the emergency room right away    See your regular doctor within 2 weeks of an  Emergency Room or Urgent Care visit for follow-up treatment.          Annual Reminders:  Meet with Asthma Educator,  Flu Shot in the Fall, consider Pneumonia Vaccination for patients with asthma (aged 19 and older).    Pharmacy:    [id number] - [place], [place] - [address] AT [id number] - [place], [place] - [address] AT [place] [address]    Electronically signed by Radha Villatoro CNP   Date: 06/12/24                    Asthma Triggers  How To Control Things That Make Your Asthma Worse    Triggers are things that make your asthma worse.  Look at the list below to help you find your triggers and   what you can do about them. You can help prevent asthma flare-ups by staying away from your triggers.      Trigger                                                          What you can do   Cigarette Smoke  Tobacco smoke can make asthma worse. Do not allow smoking in your home, car or around you.  Be sure no one smokes at a child s day care or school.  If you smoke, ask your health care provider for ways to help you quit.  Ask family members to quit too.  Ask your health care provider for a referral to Quit Plan to help you quit smoking, or call 6-361-405-PLAN.     Colds, Flu, Bronchitis  These are common triggers of asthma. Wash your hands often.  Don t touch your eyes, nose or mouth.  Get a flu shot every year.     Dust Mites  These are tiny bugs that live in cloth or carpet. They are too small to see. Wash sheets and blankets in hot water every week.   Encase pillows and mattress in dust mite proof covers.  Avoid having carpet if you can. If you have carpet, vacuum weekly.   Use a dust mask and HEPA vacuum.   Pollen and Outdoor Mold  Some people are allergic to trees, grass, or weed pollen, or molds. Try to keep your windows closed.  Limit time out doors when pollen count is high.   Ask you health care provider about taking medicine during  allergy season.     Animal Dander  Some people are allergic to skin flakes, urine or saliva from pets with fur or feathers. Keep pets with fur or feathers out of your home.    If you can t keep the pet outdoors, then keep the pet out of your bedroom.  Keep the bedroom door closed.  Keep pets off cloth furniture and away from stuffed toys.     Mice, Rats, and Cockroaches  Some people are allergic to the waste from these pests.   Cover food and garbage.  Clean up spills and food crumbs.  Store grease in the refrigerator.   Keep food out of the bedroom.   Indoor Mold  This can be a trigger if your home has high moisture. Fix leaking faucets, pipes, or other sources of water.   Clean moldy surfaces.  Dehumidify basement if it is damp and smelly.   Smoke, Strong Odors, and Sprays  These can reduce air quality. Stay away from strong odors and sprays, such as perfume, powder, hair spray, paints, smoke incense, paint, cleaning products, candles and new carpet.   Exercise or Sports  Some people with asthma have this trigger. Be active!  Ask your doctor about taking medicine before sports or exercise to prevent symptoms.    Warm up for 5-10 minutes before and after sports or exercise.     Other Triggers of Asthma  Cold air:  Cover your nose and mouth with a scarf.  Sometimes laughing or crying can be a trigger.  Some medicines and food can trigger asthma.

## 2024-06-13 LAB
HBV SURFACE AB SERPL IA-ACNC: <3.5 M[IU]/ML
HBV SURFACE AB SERPL IA-ACNC: NONREACTIVE M[IU]/ML

## 2024-06-14 LAB
GAMMA INTERFERON BACKGROUND BLD IA-ACNC: 0 IU/ML
M TB IFN-G BLD-IMP: NEGATIVE
M TB IFN-G CD4+ BCKGRND COR BLD-ACNC: 10 IU/ML
MITOGEN IGNF BCKGRD COR BLD-ACNC: 0.01 IU/ML
MITOGEN IGNF BCKGRD COR BLD-ACNC: 0.01 IU/ML
QUANTIFERON MITOGEN: 10 IU/ML
QUANTIFERON NIL TUBE: 0 IU/ML
QUANTIFERON TB1 TUBE: 0.01 IU/ML
QUANTIFERON TB2 TUBE: 0.01

## 2024-06-17 ENCOUNTER — ALLIED HEALTH/NURSE VISIT (OUTPATIENT)
Dept: FAMILY MEDICINE | Facility: CLINIC | Age: 24
End: 2024-06-17
Payer: COMMERCIAL

## 2024-06-17 DIAGNOSIS — Z23 ENCOUNTER FOR IMMUNIZATION: Primary | ICD-10-CM

## 2024-06-17 DIAGNOSIS — Z28.39 HEPATITIS B VACCINATION NOT UP TO DATE: Primary | ICD-10-CM

## 2024-06-17 PROCEDURE — 90471 IMMUNIZATION ADMIN: CPT

## 2024-06-17 PROCEDURE — 99207 PR NO CHARGE NURSE ONLY: CPT

## 2024-06-17 PROCEDURE — 90746 HEPB VACCINE 3 DOSE ADULT IM: CPT

## 2024-06-17 NOTE — PROGRESS NOTES
Prior to immunization administration, verified patients identity using patient s name and date of birth. Please see Immunization Activity for additional information.     Screening Questionnaire for Adult Immunization    Are you sick today?   No   Do you have allergies to medications, food, a vaccine component or latex?   No   Have you ever had a serious reaction after receiving a vaccination?   No   Do you have a long-term health problem with heart, lung, kidney, or metabolic disease (e.g., diabetes), asthma, a blood disorder, no spleen, complement component deficiency, a cochlear implant, or a spinal fluid leak?  Are you on long-term aspirin therapy?   No   Do you have cancer, leukemia, HIV/AIDS, or any other immune system problem?   No   Do you have a parent, brother, or sister with an immune system problem?   No   In the past 3 months, have you taken medications that affect  your immune system, such as prednisone, other steroids, or anticancer drugs; drugs for the treatment of rheumatoid arthritis, Crohn s disease, or psoriasis; or have you had radiation treatments?   No   Have you had a seizure, or a brain or other nervous system problem?   No   During the past year, have you received a transfusion of blood or blood    products, or been given immune (gamma) globulin or antiviral drug?   No   For women: Are you pregnant or is there a chance you could become       pregnant during the next month?   No   Have you received any vaccinations in the past 4 weeks?   No     Immunization questionnaire answers were all negative.    I have reviewed the following standing orders:   This patient is due and qualifies for the Hepatitis B vaccine.    Click here for Hepatitis B Standing Order    I have reviewed the vaccines inclusion and exclusion criteria; No concerns regarding eligibility.     Patient instructed to remain in clinic for 15 minutes afterwards, and to report any adverse reactions.     Screening performed by Ziggy  ARAMIS Gordon on 6/17/2024 at 8:34 AM.

## 2024-09-05 ENCOUNTER — MYC MEDICAL ADVICE (OUTPATIENT)
Dept: FAMILY MEDICINE | Facility: CLINIC | Age: 24
End: 2024-09-05
Payer: COMMERCIAL

## 2024-09-05 DIAGNOSIS — E13.9 OTHER SPECIFIED DIABETES MELLITUS WITHOUT COMPLICATION, WITHOUT LONG-TERM CURRENT USE OF INSULIN (H): Primary | ICD-10-CM

## 2024-09-29 ENCOUNTER — HEALTH MAINTENANCE LETTER (OUTPATIENT)
Age: 24
End: 2024-09-29

## 2025-01-18 ENCOUNTER — HEALTH MAINTENANCE LETTER (OUTPATIENT)
Age: 25
End: 2025-01-18

## 2025-04-06 ENCOUNTER — HEALTH MAINTENANCE LETTER (OUTPATIENT)
Age: 25
End: 2025-04-06

## 2025-04-27 ENCOUNTER — HEALTH MAINTENANCE LETTER (OUTPATIENT)
Age: 25
End: 2025-04-27

## 2025-08-10 ENCOUNTER — HEALTH MAINTENANCE LETTER (OUTPATIENT)
Age: 25
End: 2025-08-10